# Patient Record
Sex: FEMALE | Race: WHITE | NOT HISPANIC OR LATINO
[De-identification: names, ages, dates, MRNs, and addresses within clinical notes are randomized per-mention and may not be internally consistent; named-entity substitution may affect disease eponyms.]

---

## 2019-10-18 ENCOUNTER — OTHER (OUTPATIENT)
Age: 29
End: 2019-10-18

## 2019-10-18 DIAGNOSIS — K58.0 IRRITABLE BOWEL SYNDROME WITH DIARRHEA: ICD-10-CM

## 2019-10-18 PROBLEM — Z00.00 ENCOUNTER FOR PREVENTIVE HEALTH EXAMINATION: Status: ACTIVE | Noted: 2019-10-18

## 2021-11-24 ENCOUNTER — APPOINTMENT (OUTPATIENT)
Dept: GASTROENTEROLOGY | Facility: CLINIC | Age: 31
End: 2021-11-24

## 2022-07-04 ENCOUNTER — TRANSCRIPTION ENCOUNTER (OUTPATIENT)
Age: 32
End: 2022-07-04

## 2022-07-05 ENCOUNTER — APPOINTMENT (OUTPATIENT)
Dept: PAIN MANAGEMENT | Facility: CLINIC | Age: 32
End: 2022-07-05

## 2022-07-05 VITALS
HEIGHT: 64 IN | WEIGHT: 170 LBS | DIASTOLIC BLOOD PRESSURE: 84 MMHG | TEMPERATURE: 98 F | BODY MASS INDEX: 29.02 KG/M2 | SYSTOLIC BLOOD PRESSURE: 128 MMHG

## 2022-07-05 DIAGNOSIS — Z87.39 PERSONAL HISTORY OF OTHER DISEASES OF THE MUSCULOSKELETAL SYSTEM AND CONNECTIVE TISSUE: ICD-10-CM

## 2022-07-05 PROCEDURE — 99204 OFFICE O/P NEW MOD 45 MIN: CPT

## 2022-07-05 RX ORDER — METRONIDAZOLE 7.5 MG/G
0.75 CREAM TOPICAL
Qty: 45 | Refills: 0 | Status: ACTIVE | COMMUNITY
Start: 2022-01-11

## 2022-07-05 RX ORDER — VENLAFAXINE HYDROCHLORIDE 37.5 MG/1
37.5 CAPSULE, EXTENDED RELEASE ORAL
Qty: 30 | Refills: 0 | Status: ACTIVE | COMMUNITY
Start: 2022-02-10

## 2022-07-05 RX ORDER — NORETHINDRONE ACETATE AND ETHINYL ESTRADIOL, ETHINYL ESTRADIOL AND FERROUS FUMARATE 1MG-10(24)
1 MG-10 MCG / KIT ORAL
Qty: 28 | Refills: 0 | Status: ACTIVE | COMMUNITY
Start: 2022-07-01

## 2022-07-05 NOTE — ASSESSMENT
[FreeTextEntry1] : 32 yof w/ severe low back and left leg pain.\par \par I have personally reviewed the patient's CT in detail and discussed it with them which is significant for moderate left foraminal narrowing at L4-L5.\par \par Start cyclobenzaprine prn for muscular pain.\par \par The patient has failed to have relief with over six weeks of physical therapy within the last three months and all medications. GIven their failure to improve with all other conservative measures recommend MRI lumbar spine. Patient will return to review imaging and plan for potential intervention.\par \par The patient has failed to have relief with medication management. The patient has failed to have relief with more then six weeks of physical therapy within the last three months. Given the patients failure to improve with all other conservative measures, recommend L5-S1 interlaminar epidural steroid injection under fluoroscopic guidance. The patient will follow-up with me in my office two weeks following intervention.\par \par I have discussed in detail with the patient that an interventional spine procedure is associated with potential risks. The procedure may include an injection of steroid and potentially other medications (local anesthetic and normal saline) into the epidural space or surrounding tissue of the spine. There are significant risks of this procedure which include and are not limited to infection, bleeding, worsening pain, dural puncture leading to post-dural puncture headache, nerve damage, spinal cord injury, paralysis, stroke, and death. There is a chance that the procedure does not improve their pain. There are risks associated with the steroid being absorbed into the body systemically. These include dysphoria, difficulty sleeping, mood swings, and personality changes. Pre-menopausal women may notice a regularity his in her menstrual cycle for 2-3 months following the injection. Steroids can specifically affect patients with hypertension, diabetes, and peptic ulcers. The procedure may cause a temporary increase in blood pressure and blood glucose, and may adversely affect a peptic ulcer. Other, more rare complications, including avascular necrosis of the joints, glaucoma, and osteoporosis. I have discussed the risks of the procedure at length with the patient, and the potential benefits of pain relief. I have offered alternatives to the procedure. All questions were answered. The patient expressed understanding and wishes to proceed with the procedure.\par \par Physical therapy prescribed - goal will be to increase ROM, strengthening, postural training, other modalities ad enrique which may include massage and stim. Goals of therapy discussed with the patient in detail and will be discussed with physical therapist. Patient will follow-up following course of physical therapy to monitor progress and adjust therapy as needed.\par \par Acetaminophen 1,000 mg q8h prn for moderate pain. Risks, benefits, and alternatives of acetaminophen discussed with patient.\par \par Ibuprofen 600 mg q8h prn add when pain is not adequately controlled with acetaminophen. Risks, benefits, and alternatives of ibuprofen discussed with patient.\par

## 2022-07-05 NOTE — PHYSICAL EXAM

## 2022-07-05 NOTE — DATA REVIEWED
[FreeTextEntry1] : CT LUMBAR SPINE (07/04/22):\par \par \par  The lumbar alignment is maintained without spondylolisthesis. Partial sacralization of the right L5\par transverse process is again noted. No acute fracture is identified. The vertebral body heights are \par preserved. There is mild degenerative disc disease at L4-5 and L5-S1, increased since 2018. The \par paraspinal soft tissues are within normal limits. \par \par  There are diffuse disc bulges at L3-4, L4-5, and L5-S1 with a possible superimposed broad-based \par central disc herniation at L4-5, mildly increased since previous exam. No significant central canal \par stenosis is noted. New moderate left neural foraminal narrowing is seen at L4-5 due to uncovertebral\par spurring. \par \par \par  IMPRESSION: \par \par  No acute osseous abnormality of the lumbar spine. No significant central canal stenosis. Moderate \par left neural foraminal narrowing at L4-5.

## 2022-07-05 NOTE — HISTORY OF PRESENT ILLNESS
[5] : 3. What number best describes how, during the past week, pain has interfered with your general activity? 5/10 pain [Back Pain] : back pain [___ days] : [unfilled] day(s) ago [Constant] : constant [8] : a minimum pain level of 8/10 [10] : a maximum pain level of 10/10 [Sharp] : sharp [Dull] : dull [Throbbing] : throbbing [Shooting] : shooting [Sitting] : sitting [Standing] : standing [Walking] : walking [Bending] : bending [Medications] : medications [FreeTextEntry1] : 32 yof presents w/ severe low back and left leg pain. She has had the pain for years but it became severe 48 hours ago. She was seen in the emergency room for this. Quality of life is impaired. There has been a severe exacerbation of the patient's chronic pain.  [FreeTextEntry2] : 13 [FreeTextEntry7] : Lower back pain , radiating down left hip

## 2022-07-19 ENCOUNTER — RESULT REVIEW (OUTPATIENT)
Age: 32
End: 2022-07-19

## 2022-07-25 ENCOUNTER — APPOINTMENT (OUTPATIENT)
Dept: PAIN MANAGEMENT | Facility: HOSPITAL | Age: 32
End: 2022-07-25

## 2022-10-11 ENCOUNTER — APPOINTMENT (OUTPATIENT)
Dept: PAIN MANAGEMENT | Facility: CLINIC | Age: 32
End: 2022-10-11

## 2022-10-11 VITALS
BODY MASS INDEX: 29.02 KG/M2 | HEART RATE: 133 BPM | OXYGEN SATURATION: 99 % | DIASTOLIC BLOOD PRESSURE: 114 MMHG | SYSTOLIC BLOOD PRESSURE: 160 MMHG | WEIGHT: 170 LBS | HEIGHT: 64 IN

## 2022-10-11 PROCEDURE — 99214 OFFICE O/P EST MOD 30 MIN: CPT

## 2022-10-11 NOTE — ASSESSMENT
[FreeTextEntry1] : 32 yof w/ severe low back and bilateral leg pain.\par \par I have personally reviewed the patient's CT in detail and discussed it with them which is significant for moderate left foraminal narrowing at L4-L5.\par \par I have personally reviewed the patient's MRI in detail and discussed it with them which is significant for disc bulge with annular tearing at L4-L5.\par \par The patient has failed to have relief with medication management. The patient has failed to have relief with more then six weeks of physical therapy within the last three months. Given the patients failure to improve with all other conservative measures, recommend bilateral L4-L5 transforaminal epidural steroid injection under fluoroscopic guidance. The patient will follow-up with me in my office two weeks following intervention.\par \par I have discussed in detail with the patient that an interventional spine procedure is associated with potential risks. The procedure may include an injection of steroid and potentially other medications (local anesthetic and normal saline) into the epidural space or surrounding tissue of the spine. There are significant risks of this procedure which include and are not limited to infection, bleeding, worsening pain, dural puncture leading to post-dural puncture headache, nerve damage, spinal cord injury, paralysis, stroke, and death. There is a chance that the procedure does not improve their pain. There are risks associated with the steroid being absorbed into the body systemically. These include dysphoria, difficulty sleeping, mood swings, and personality changes. Pre-menopausal women may notice a regularity his in her menstrual cycle for 2-3 months following the injection. Steroids can specifically affect patients with hypertension, diabetes, and peptic ulcers. The procedure may cause a temporary increase in blood pressure and blood glucose, and may adversely affect a peptic ulcer. Other, more rare complications, including avascular necrosis of the joints, glaucoma, and osteoporosis. I have discussed the risks of the procedure at length with the patient, and the potential benefits of pain relief. I have offered alternatives to the procedure. All questions were answered. The patient expressed understanding and wishes to proceed with the procedure.\par \par Medrol dose pack for acute pain.\par \par Physical therapy prescribed - goal will be to increase ROM, strengthening, postural training, other modalities ad enrique which may include massage and stim. Goals of therapy discussed with the patient in detail and will be discussed with physical therapist. Patient will follow-up following course of physical therapy to monitor progress and adjust therapy as needed.\par \par Acetaminophen 1,000 mg q8h prn for moderate pain. Risks, benefits, and alternatives of acetaminophen discussed with patient.\par \par Ibuprofen 600 mg q8h prn add when pain is not adequately controlled with acetaminophen. Risks, benefits, and alternatives of ibuprofen discussed with patient.\par \par Diet and nutritional strategies discussed which may improve patients pain and will improve overall health.\par \par

## 2022-10-11 NOTE — HISTORY OF PRESENT ILLNESS
[Back Pain] : back pain [___ days] : [unfilled] day(s) ago [Constant] : constant [8] : a minimum pain level of 8/10 [10] : a maximum pain level of 10/10 [Sharp] : sharp [Dull] : dull [Throbbing] : throbbing [Shooting] : shooting [Sitting] : sitting [Standing] : standing [Walking] : walking [Bending] : bending [Medications] : medications [5] : 3. What number best describes how, during the past week, pain has interfered with your general activity? 5/10 pain [FreeTextEntry1] : Interval History:\par Patient reports that she was doing well with PT however her pain just became severe again. Pain is severe. She cannot work due to the pain. No relief w/ NSAIDS.\par \par HPI: 32 yof presents w/ severe low back and left leg pain. She has had the pain for years but it became severe 48 hours ago. She was seen in the emergency room for this. Quality of life is impaired. There has been a severe exacerbation of the patient's chronic pain.  [FreeTextEntry7] : Lower back pain , radiating down left hip  [FreeTextEntry2] : 15

## 2022-10-11 NOTE — DATA REVIEWED
[FreeTextEntry1] : 07/19/22\par MRI LUMBAR SPINE\par \par \par  There are 6 nonrib-bearing lumbar-type vertebral bodies. There is lumbarization of the S1 vertebral\par body. The most inferior complete and largest intravertebral disc space has been annotated as the\par S1/S2 disc space. The disc space superior to this disc space is annotated as the L5/S1 level. If\par surgical manipulation is contemplated, would recommend comparison of this study to plain films of\par the entire spine for counting purposes.\par \par  There is normal curvature to the lumbar lordosis. The vertebral bodies are normal height and\par configuration. There is moderate loss of disc height and T2 signal at the L5/S1 disc space with\par adjacent Modic 2 surrounding changes consistent with desiccation and degenerative disease. There is\par mild loss of T2 signal within the L4/L5 disc space consistent with desiccation. The intervertebral\par disc spaces are within normal limits. The conus terminates at the L1/L2 level and demonstrates no\par evidence of abnormal signal changes. The spinal canal is capacious.\par \par  Evaluation of the individual levels demonstrates at the S1/S2 level no evidence of focal disc\par herniation or spinal canal stenosis.\par \par  At the L5/S1 level there is a mild diffuse disc bulge with a small central disc protrusion\par flattening the ventral thecal sac and and mildly compressing the the left descending S1 nerve root.\par There is mild bilateral foraminal narrowing. The bilateral L5 foraminal exiting nerve roots are\par within normal limits. There is moderate facet and ligamentous hypertrophy. There is no evidence of\par spinal canal stenosis.\par \par  At the L4/L5 level there is a mild diffuse disc bulge with a tiny posterior annular tear flattening\par the ventral thecal sac. The bilateral neuroforamen are patent. The bilateral L4 exiting nerve roots\par are within normal limits. There is mild to moderate facet and ligamentous hypertrophy. There is no\par evidence of spinal canal stenosis.\par \par  The remaining levels there is no evidence of a focal disc herniation. The bilateral neuroforamen\par are patent. The bilateral exiting foraminal nerve roots are within normal limits. There is no\par evidence of spinal canal stenosis.\par \par \par  Impression:\par \par  6 NONRIB-BEARING LUMBAR-TYPE VERTEBRAL BODIES. THERE IS LUMBARIZATION OF THE S1 VERTEBRAL BODY. THE\par MOST INFERIOR COMPLETE AND LARGEST INTRAVERTEBRAL DISC SPACE HAS BEEN ANNOTATED AS THE S1/S2 DISC\par SPACE. THE DISC SPACE SUPERIOR TO THIS DISC SPACE IS ANNOTATED AS THE L5/S1 LEVEL. IF SURGICAL\par MANIPULATION IS CONTEMPLATED, WOULD RECOMMEND COMPARISON OF THIS STUDY TO PLAIN FILMS OF THE ENTIRE\par SPINE FOR COUNTING PURPOSES.\par \par \par  Mild degenerative changes of the lumbar spine.\par \par  L5/S1 mild diffuse disc bulge with a small central disc protrusion mildly compressing the left\par descending S1 nerve root. L5/S1 no evidence of spinal canal stenosis.\par \par  L4/L5 mild diffuse disc bulge with a tiny posterior annular tear. L4/L5 no evidence of spinal canal\par stenosis.\par \par \par CT LUMBAR SPINE (07/04/22):\par \par \par  The lumbar alignment is maintained without spondylolisthesis. Partial sacralization of the right L5\par transverse process is again noted. No acute fracture is identified. The vertebral body heights are \par preserved. There is mild degenerative disc disease at L4-5 and L5-S1, increased since 2018. The \par paraspinal soft tissues are within normal limits. \par \par  There are diffuse disc bulges at L3-4, L4-5, and L5-S1 with a possible superimposed broad-based \par central disc herniation at L4-5, mildly increased since previous exam. No significant central canal \par stenosis is noted. New moderate left neural foraminal narrowing is seen at L4-5 due to uncovertebral\par spurring. \par \par \par  IMPRESSION: \par \par  No acute osseous abnormality of the lumbar spine. No significant central canal stenosis. Moderate \par left neural foraminal narrowing at L4-5.

## 2022-10-11 NOTE — PHYSICAL EXAM

## 2022-10-19 ENCOUNTER — RESULT REVIEW (OUTPATIENT)
Age: 32
End: 2022-10-19

## 2022-10-19 ENCOUNTER — APPOINTMENT (OUTPATIENT)
Dept: PAIN MANAGEMENT | Facility: HOSPITAL | Age: 32
End: 2022-10-19

## 2022-10-19 ENCOUNTER — TRANSCRIPTION ENCOUNTER (OUTPATIENT)
Age: 32
End: 2022-10-19

## 2022-11-03 ENCOUNTER — APPOINTMENT (OUTPATIENT)
Dept: PAIN MANAGEMENT | Facility: CLINIC | Age: 32
End: 2022-11-03

## 2022-11-03 PROCEDURE — 99214 OFFICE O/P EST MOD 30 MIN: CPT | Mod: 95

## 2022-11-03 NOTE — PHYSICAL EXAM

## 2022-11-03 NOTE — HISTORY OF PRESENT ILLNESS
[Back Pain] : back pain [___ days] : [unfilled] day(s) ago [Constant] : constant [8] : a minimum pain level of 8/10 [10] : a maximum pain level of 10/10 [Sharp] : sharp [Dull] : dull [Throbbing] : throbbing [Shooting] : shooting [Sitting] : sitting [Standing] : standing [Walking] : walking [Bending] : bending [Medications] : medications [5] : 3. What number best describes how, during the past week, pain has interfered with your general activity? 5/10 pain [FreeTextEntry1] : Verbal consent was given by/on: Fiona Carbajal on 11/03/22\par \par Patient location: Home\par \par Physician location: Office\par \par Reason for Telehealth visit: Back pain\par \par She returns s/p JENNIFER. Pain remains to the left of her low back. She reports modest improvement in pain. She still cannot sit due to pain. Quality of life is impaired. There has been a severe exacerbation of the patient's chronic pain. \par \par This service took place using a two way audio and visual platform. The patient and Dr. Echavarria were both able to see each other and communicate through video. There were no barriers to communication. Greater then 50% of the time spent in the encounter involved counseling and coordination of care. \par \par Time spent on visit: 30 minutes\par \par \par HPI: 32 yof presents w/ severe low back and left leg pain. She has had the pain for years but it became severe 48 hours ago. She was seen in the emergency room for this. Quality of life is impaired. There has been a severe exacerbation of the patient's chronic pain.  [FreeTextEntry7] : Lower back pain , radiating down left hip  [FreeTextEntry2] : 15

## 2022-11-03 NOTE — ASSESSMENT
[FreeTextEntry1] : 32 yof w/ severe low back and bilateral leg pain.\par \par I have personally reviewed the patient's CT in detail and discussed it with them which is significant for moderate left foraminal narrowing at L4-L5.\par \par I have personally reviewed the patient's MRI in detail and discussed it with them which is significant for disc bulge with annular tearing at L4-L5.\par \par Start gabapentin 300 mg TID.\par \par Physical therapy prescribed - goal will be to increase ROM, strengthening, postural training, other modalities ad enrique which may include massage and stim. Goals of therapy discussed with the patient in detail and will be discussed with physical therapist. Patient will follow-up following course of physical therapy to monitor progress and adjust therapy as needed.\par \par Acetaminophen 1,000 mg q8h prn for moderate pain. Risks, benefits, and alternatives of acetaminophen discussed with patient.\par \par Ibuprofen 600 mg q8h prn add when pain is not adequately controlled with acetaminophen. Risks, benefits, and alternatives of ibuprofen discussed with patient.\par \par Diet and nutritional strategies discussed which may improve patients pain and will improve overall health.\par \par If pain persists plan for L5-S1 interlaminar epidural steroid injection.\par \par I explained to patient benefits and limitation of TeleMedicine visits. Patient understands that limitations include inability to perform comprehensive physical exam, which may lead to potential diagnostic inconsistencies.  Patient understands that diagnosis and treatment may be limited by these inconsistencies and patient agrees to proceed with care plan\par

## 2022-11-03 NOTE — DATA REVIEWED
[FreeTextEntry1] : 07/19/22\par MRI LUMBAR SPINE\par \par  There are 6 nonrib-bearing lumbar-type vertebral bodies. There is lumbarization of the S1 vertebral\par body. The most inferior complete and largest intravertebral disc space has been annotated as the\par S1/S2 disc space. The disc space superior to this disc space is annotated as the L5/S1 level. If\par surgical manipulation is contemplated, would recommend comparison of this study to plain films of\par the entire spine for counting purposes.\par \par  There is normal curvature to the lumbar lordosis. The vertebral bodies are normal height and\par configuration. There is moderate loss of disc height and T2 signal at the L5/S1 disc space with\par adjacent Modic 2 surrounding changes consistent with desiccation and degenerative disease. There is\par mild loss of T2 signal within the L4/L5 disc space consistent with desiccation. The intervertebral\par disc spaces are within normal limits. The conus terminates at the L1/L2 level and demonstrates no\par evidence of abnormal signal changes. The spinal canal is capacious.\par \par  Evaluation of the individual levels demonstrates at the S1/S2 level no evidence of focal disc\par herniation or spinal canal stenosis.\par \par  At the L5/S1 level there is a mild diffuse disc bulge with a small central disc protrusion\par flattening the ventral thecal sac and and mildly compressing the the left descending S1 nerve root.\par There is mild bilateral foraminal narrowing. The bilateral L5 foraminal exiting nerve roots are\par within normal limits. There is moderate facet and ligamentous hypertrophy. There is no evidence of\par spinal canal stenosis.\par \par  At the L4/L5 level there is a mild diffuse disc bulge with a tiny posterior annular tear flattening\par the ventral thecal sac. The bilateral neuroforamen are patent. The bilateral L4 exiting nerve roots\par are within normal limits. There is mild to moderate facet and ligamentous hypertrophy. There is no\par evidence of spinal canal stenosis.\par \par  The remaining levels there is no evidence of a focal disc herniation. The bilateral neuroforamen\par are patent. The bilateral exiting foraminal nerve roots are within normal limits. There is no\par evidence of spinal canal stenosis.\par \par \par  Impression:\par \par  6 NONRIB-BEARING LUMBAR-TYPE VERTEBRAL BODIES. THERE IS LUMBARIZATION OF THE S1 VERTEBRAL BODY. THE\par MOST INFERIOR COMPLETE AND LARGEST INTRAVERTEBRAL DISC SPACE HAS BEEN ANNOTATED AS THE S1/S2 DISC\par SPACE. THE DISC SPACE SUPERIOR TO THIS DISC SPACE IS ANNOTATED AS THE L5/S1 LEVEL. IF SURGICAL\par MANIPULATION IS CONTEMPLATED, WOULD RECOMMEND COMPARISON OF THIS STUDY TO PLAIN FILMS OF THE ENTIRE\par SPINE FOR COUNTING PURPOSES.\par \par \par  Mild degenerative changes of the lumbar spine.\par \par  L5/S1 mild diffuse disc bulge with a small central disc protrusion mildly compressing the left\par descending S1 nerve root. L5/S1 no evidence of spinal canal stenosis.\par \par  L4/L5 mild diffuse disc bulge with a tiny posterior annular tear. L4/L5 no evidence of spinal canal\par stenosis.\par \par \par CT LUMBAR SPINE (07/04/22):\par \par \par  The lumbar alignment is maintained without spondylolisthesis. Partial sacralization of the right L5\par transverse process is again noted. No acute fracture is identified. The vertebral body heights are \par preserved. There is mild degenerative disc disease at L4-5 and L5-S1, increased since 2018. The \par paraspinal soft tissues are within normal limits. \par \par  There are diffuse disc bulges at L3-4, L4-5, and L5-S1 with a possible superimposed broad-based \par central disc herniation at L4-5, mildly increased since previous exam. No significant central canal \par stenosis is noted. New moderate left neural foraminal narrowing is seen at L4-5 due to uncovertebral\par spurring. \par \par \par  IMPRESSION: \par \par  No acute osseous abnormality of the lumbar spine. No significant central canal stenosis. Moderate \par left neural foraminal narrowing at L4-5.

## 2022-11-29 ENCOUNTER — RX RENEWAL (OUTPATIENT)
Age: 32
End: 2022-11-29

## 2022-12-14 ENCOUNTER — APPOINTMENT (OUTPATIENT)
Dept: GASTROENTEROLOGY | Facility: CLINIC | Age: 32
End: 2022-12-14

## 2022-12-22 ENCOUNTER — APPOINTMENT (OUTPATIENT)
Dept: PAIN MANAGEMENT | Facility: CLINIC | Age: 32
End: 2022-12-22

## 2022-12-22 PROCEDURE — 99214 OFFICE O/P EST MOD 30 MIN: CPT | Mod: 95

## 2022-12-22 NOTE — HISTORY OF PRESENT ILLNESS
[Back Pain] : back pain [___ days] : [unfilled] day(s) ago [Constant] : constant [8] : a minimum pain level of 8/10 [10] : a maximum pain level of 10/10 [Sharp] : sharp [Dull] : dull [Throbbing] : throbbing [Shooting] : shooting [Sitting] : sitting [Standing] : standing [Walking] : walking [Bending] : bending [Medications] : medications [5] : 3. What number best describes how, during the past week, pain has interfered with your general activity? 5/10 pain [FreeTextEntry1] : Verbal consent was given by/on: Fiona Carbajal on 12/22/22\par \par Patient location: Home\par \par Physician location: Office\par \par Reason for Telehealth visit: Back pain\par \par She reports that she was feeling about 90% better, even able to be in a car for five hours without pain. Prior she could not sit for five minutes without pain.Gabapentin was also helping her tremendously. Recently she just worsened her pain when putting on her shoes. Pain has been severe. Steroids are helping. Pain remains severe. Quality of life is impaired. There has been a severe exacerbation of the patient's chronic pain.\par \par This service took place using a two way audio and visual platform. The patient and Dr. Echavarria were both able to see each other and communicate through video. There were no barriers to communication. Greater then 50% of the time spent in the encounter involved counseling and coordination of care. \par \par Time spent on visit: 30 minutes\par \par \par HPI: 32 yof presents w/ severe low back and left leg pain. She has had the pain for years but it became severe 48 hours ago. She was seen in the emergency room for this. Quality of life is impaired. There has been a severe exacerbation of the patient's chronic pain.  [FreeTextEntry7] : Lower back pain , radiating down left hip  [FreeTextEntry2] : 15

## 2022-12-22 NOTE — ASSESSMENT
[FreeTextEntry1] : 32 yof w/ severe low back and bilateral leg pain.\par \par I have personally reviewed the patient's CT in detail and discussed it with them which is significant for moderate left foraminal narrowing at L4-L5.\par \par I have personally reviewed the patient's MRI in detail and discussed it with them which is significant for disc bulge with annular tearing at L4-L5.\par \par Increase titration of gabapentin as tolerated.\par \par The patient has failed to have relief with medication management. The patient has failed to have relief with more then six weeks of physical therapy within the last three months. Given the patients failure to improve with all other conservative measures, recommend L4-L5 interlaminar epidural steroid injection under fluoroscopic guidance. The patient will follow-up with me in my office two weeks following intervention.\par \par I have discussed in detail with the patient that an interventional spine procedure is associated with potential risks. The procedure may include an injection of steroid and potentially other medications (local anesthetic and normal saline) into the epidural space or surrounding tissue of the spine. There are significant risks of this procedure which include and are not limited to infection, bleeding, worsening pain, dural puncture leading to post-dural puncture headache, nerve damage, spinal cord injury, paralysis, stroke, and death. There is a chance that the procedure does not improve their pain. There are risks associated with the steroid being absorbed into the body systemically. These include dysphoria, difficulty sleeping, mood swings, and personality changes. Pre-menopausal women may notice a regularity his in her menstrual cycle for 2-3 months following the injection. Steroids can specifically affect patients with hypertension, diabetes, and peptic ulcers. The procedure may cause a temporary increase in blood pressure and blood glucose, and may adversely affect a peptic ulcer. Other, more rare complications, including avascular necrosis of the joints, glaucoma, and osteoporosis. I have discussed the risks of the procedure at length with the patient, and the potential benefits of pain relief. I have offered alternatives to the procedure. All questions were answered. The patient expressed understanding and wishes to proceed with the procedure.\par \par \par Physical therapy prescribed - goal will be to increase ROM, strengthening, postural training, other modalities ad enrique which may include massage and stim. Goals of therapy discussed with the patient in detail and will be discussed with physical therapist. Patient will follow-up following course of physical therapy to monitor progress and adjust therapy as needed.\par \par Acetaminophen 1,000 mg q8h prn for moderate pain. Risks, benefits, and alternatives of acetaminophen discussed with patient.\par \par Ibuprofen 600 mg q8h prn add when pain is not adequately controlled with acetaminophen. Risks, benefits, and alternatives of ibuprofen discussed with patient.\par \par Diet and nutritional strategies discussed which may improve patients pain and will improve overall health.\par \par I explained to patient benefits and limitation of TeleMedicine visits. Patient understands that limitations include inability to perform comprehensive physical exam, which may lead to potential diagnostic inconsistencies.  Patient understands that diagnosis and treatment may be limited by these inconsistencies and patient agrees to proceed with care plan\par

## 2022-12-22 NOTE — PHYSICAL EXAM

## 2022-12-28 ENCOUNTER — APPOINTMENT (OUTPATIENT)
Dept: PAIN MANAGEMENT | Facility: CLINIC | Age: 32
End: 2022-12-28

## 2022-12-28 VITALS
OXYGEN SATURATION: 99 % | HEART RATE: 108 BPM | WEIGHT: 175 LBS | BODY MASS INDEX: 29.88 KG/M2 | DIASTOLIC BLOOD PRESSURE: 83 MMHG | HEIGHT: 64 IN | RESPIRATION RATE: 16 BRPM | SYSTOLIC BLOOD PRESSURE: 165 MMHG

## 2022-12-28 PROCEDURE — 62323 NJX INTERLAMINAR LMBR/SAC: CPT

## 2022-12-28 NOTE — PROCEDURE
[FreeTextEntry1] : PROCEDURE: L4-L5 Interlaminar epidural steroid injection under fluoroscopic guidance.\par \par CHIEF COMPLAINT: Low back and leg pain.  \par \par PREOPERATIVE DIAGNOSIS:	Lumbar radiculopathy.\par \par POSTOPERATIVE DIAGNOSIS:  Lumbar radiculopathy. 	\par \par ANESTHESIA:  Local.\par \par COMPLICATIONS:  	None.\par \par ESTIMATED BLOOD LOSS:  None.	\par \par INDICATIONS: Mrs. Carbajal is a very pleasant 32 yof who has significant low back and leg pain.  The patient has failed to have relief with medication management and physical therapy. Given their failure to improve with all other conservative measures, it was determined that the patient would benefit from a L4-L5 interlaminar epidural steroid injection under fluoroscopic guidance.  \par \par The patient denies any fevers, chills, nausea, vomiting, diarrhea, constipation, chest pain, shortness of breath, or burning on urination.  They deny any latex allergy.  They are not taking any anticoagulants and do not have a history of coagulopathy.  The patient denies any IV contrast allergy.     \par \par PROCEDURE COURSE: The risks, benefits, and alternatives of the lumbar interlaminar epidural steroid injection were explained to the patient.  All questions were answered to their own satisfaction.  Written consent was signed and placed in the chart. The patient’s low back was marked in the preoperative holding area. \par \par The patient was brought to the Operating Room and placed in the prone position with a pillow under the abdomen to reduce lumbar lordosis.  A time-out was taken identifying the patient, the procedure, the site and side, and the patient’s allergies.  ASA standard monitors were applied for monitoring throughout the procedure.  The patient’s back was prepped and draped with Chloroprep in the usual sterile fashion and sterile technique was adhered to throughout the entire procedure.\par \par The lumbar spine was visualized under fluoroscopy in the AP view. The 12th rib and T12 vertebra were identified as a reference point and the lumbar vertebral bodies were counted.  The L4 vertebral body was then squared. The vertebral body and the superior and inferior end plates were aligned and the spinous processes were adjusted until midline. The L4-L5 interlaminar space was identified under fluoroscopic guidance and a caudal tilt was utilized to optimize the opening of the interlaminar space. The skin and subcutaneous tissues were infiltrated with 1% Lidocaine.  After adequate local anesthesia was obtained, a 20g Tuohy needle was inserted at L4-L5 interspace. The needle was carefully advanced, alternating between AP and lateral views, to ensure appropriate trajectory and depth. Once the ligamentum flavum was engaged, a sterile glass syringe was employed and a loss of resistance to air technique was utilized to identify the epidural space. Once obtained, negative aspiration for hem and CSF was obtained, further confirming location. Following this, 1 cc of contrast was administered and epidural spread was confirmed in the AP and lateral views. Following this, 80 mg of methylprednisolone and 2 cc of 1% lidocaine was slowly administered in incremental amounts.\par \par All injections were done with negative aspiration for cerebrospinal fluid or heme, and all needles were withdrawn without incident. I personally met with the patient following the procedure and all questions were answered. The patient tolerated the procedure well without any apparent complications and was transferred to the Recovery Room in stable condition. The patient was provided with discharge instructions and will follow-up with me in my office.\par \par 	___________________________________\par 	Troy Echavarria M.D.\par

## 2022-12-29 ENCOUNTER — APPOINTMENT (OUTPATIENT)
Dept: PAIN MANAGEMENT | Facility: CLINIC | Age: 32
End: 2022-12-29

## 2023-01-11 ENCOUNTER — APPOINTMENT (OUTPATIENT)
Dept: PAIN MANAGEMENT | Facility: CLINIC | Age: 33
End: 2023-01-11
Payer: COMMERCIAL

## 2023-01-11 PROCEDURE — 99214 OFFICE O/P EST MOD 30 MIN: CPT | Mod: 95

## 2023-01-11 NOTE — ASSESSMENT
[FreeTextEntry1] : 32 yof w/ severe low back and bilateral leg pain now with complete relief after JENNIFER. \par \par I have personally reviewed the patient's CT in detail and discussed it with them which is significant for moderate left foraminal narrowing at L4-L5.\par \par I have personally reviewed the patient's MRI in detail and discussed it with them which is significant for disc bulge with annular tearing at L4-L5.\par \par She may discontinue gabapentin as it is no longer needed.\par \par Physical therapy prescribed - goal will be to increase ROM, strengthening, postural training, other modalities ad enrique which may include massage and stim. Goals of therapy discussed with the patient in detail and will be discussed with physical therapist. Patient will follow-up following course of physical therapy to monitor progress and adjust therapy as needed.\par \par Acetaminophen 1,000 mg q8h prn for moderate pain. Risks, benefits, and alternatives of acetaminophen discussed with patient.\par \par Ibuprofen 600 mg q8h prn add when pain is not adequately controlled with acetaminophen. Risks, benefits, and alternatives of ibuprofen discussed with patient.\par \par Diet and nutritional strategies discussed which may improve patients pain and will improve overall health.\par  \par RTC three months.\par \par I explained to patient benefits and limitation of TeleMedicine visits. Patient understands that limitations include inability to perform comprehensive physical exam, which may lead to potential diagnostic inconsistencies.  Patient understands that diagnosis and treatment may be limited by these inconsistencies and patient agrees to proceed with care plan\par

## 2023-01-11 NOTE — HISTORY OF PRESENT ILLNESS
[Back Pain] : back pain [___ days] : [unfilled] day(s) ago [Constant] : constant [8] : a minimum pain level of 8/10 [10] : a maximum pain level of 10/10 [Sharp] : sharp [Dull] : dull [Throbbing] : throbbing [Shooting] : shooting [Sitting] : sitting [Standing] : standing [Walking] : walking [Bending] : bending [Medications] : medications [5] : 3. What number best describes how, during the past week, pain has interfered with your general activity? 5/10 pain [FreeTextEntry1] : Verbal consent was given by/on: Fiona Carbajal on 01/11/23\par \par Patient location: Home\par \par Physician location: Office\par \par Reason for Telehealth visit: Back pain\par \par She reports 100% pain relief from her JENNIFER. No side effects. She is excited to talk about a exercise program that she can follow. She does not have the time for physical therapy right now. \par \par This service took place using a two way audio and visual platform. The patient and Dr. Echavarria were both able to see each other and communicate through video. There were no barriers to communication. Greater then 50% of the time spent in the encounter involved counseling and coordination of care. \par \par Time spent on visit: 30 minutes\par \par \par HPI: 32 yof presents w/ severe low back and left leg pain. She has had the pain for years but it became severe 48 hours ago. She was seen in the emergency room for this. Quality of life is impaired. There has been a severe exacerbation of the patient's chronic pain.  [FreeTextEntry7] : Lower back pain , radiating down left hip  [FreeTextEntry2] : 15

## 2023-01-11 NOTE — PHYSICAL EXAM
[de-identified] : Constitutional: Well-developed, in no acute distress\par \par Psychiatric: Appropriate mood and affect, oriented to time, place, person, and situation\par \par \par

## 2023-05-24 ENCOUNTER — APPOINTMENT (OUTPATIENT)
Dept: PAIN MANAGEMENT | Facility: CLINIC | Age: 33
End: 2023-05-24
Payer: COMMERCIAL

## 2023-05-24 PROCEDURE — 99214 OFFICE O/P EST MOD 30 MIN: CPT | Mod: 95

## 2023-05-24 NOTE — HISTORY OF PRESENT ILLNESS
[Back Pain] : back pain [___ days] : [unfilled] day(s) ago [Constant] : constant [8] : a minimum pain level of 8/10 [10] : a maximum pain level of 10/10 [Sharp] : sharp [Dull] : dull [Throbbing] : throbbing [Shooting] : shooting [Sitting] : sitting [Standing] : standing [Walking] : walking [Bending] : bending [Medications] : medications [5] : 3. What number best describes how, during the past week, pain has interfered with your general activity? 5/10 pain [FreeTextEntry1] : Verbal consent was given by/on: Fiona Carbajal on 05/24/23\par \par Patient location: Home\par \par Physician location: Office\par \par Reason for Telehealth visit: Back pain\par \par She reports 100% pain relief from her JENNIFER back in December. She reports now that her pain recently returned. She started a medrol dose pack which is helpful. She has also started back on gabapentin. All of her complaints began about a year ago. Quality of life is impaired. There has been a severe exacerbation of the patient's chronic pain.\par \par This service took place using a two way audio and visual platform. The patient and Dr. Echavarria were both able to see each other and communicate through video. There were no barriers to communication. Greater then 50% of the time spent in the encounter involved counseling and coordination of care. \par \par Time spent on visit: 30 minutes\par \par HPI: 32 yof presents w/ severe low back and left leg pain. She has had the pain for years but it became severe 48 hours ago. She was seen in the emergency room for this. Quality of life is impaired. There has been a severe exacerbation of the patient's chronic pain.  [FreeTextEntry7] : Lower back pain , radiating down left hip  [FreeTextEntry2] : 15

## 2023-05-24 NOTE — PHYSICAL EXAM
[de-identified] : Constitutional: Well-developed, in no acute distress\par \par Psychiatric: Appropriate mood and affect, oriented to time, place, person, and situation\par \par \par

## 2023-05-24 NOTE — ASSESSMENT
[FreeTextEntry1] : 32 yof w/ severe low back and bilateral leg pain now with complete relief after JENNIFER but pain has recently returned.\par \par I have personally reviewed the patient's CT in detail and discussed it with them which is significant for moderate left foraminal narrowing at L4-L5.\par \par I have personally reviewed the patient's MRI in detail and discussed it with them which is significant for disc bulge with annular tearing at L4-L5.\par \par She is taking gabapentin at this time, continue this.\par \par If pain persists plan for JENNIFER. \par \par Physical therapy prescribed - goal will be to increase ROM, strengthening, postural training, other modalities ad enrique which may include massage and stim. Goals of therapy discussed with the patient in detail and will be discussed with physical therapist. Patient will follow-up following course of physical therapy to monitor progress and adjust therapy as needed.\par \par Acetaminophen 1,000 mg q8h prn for moderate pain. Risks, benefits, and alternatives of acetaminophen discussed with patient.\par \par Ibuprofen 600 mg q8h prn add when pain is not adequately controlled with acetaminophen. Risks, benefits, and alternatives of ibuprofen discussed with patient.\par \par Diet and nutritional strategies discussed which may improve patients pain and will improve overall health.\par  \par RTC three months.\par \par I explained to patient benefits and limitation of TeleMedicine visits. Patient understands that limitations include inability to perform comprehensive physical exam, which may lead to potential diagnostic inconsistencies.  Patient understands that diagnosis and treatment may be limited by these inconsistencies and patient agrees to proceed with care plan\par

## 2023-07-18 ENCOUNTER — APPOINTMENT (OUTPATIENT)
Dept: GASTROENTEROLOGY | Facility: CLINIC | Age: 33
End: 2023-07-18
Payer: COMMERCIAL

## 2023-07-18 VITALS
WEIGHT: 185 LBS | SYSTOLIC BLOOD PRESSURE: 166 MMHG | OXYGEN SATURATION: 100 % | BODY MASS INDEX: 31.58 KG/M2 | HEART RATE: 95 BPM | HEIGHT: 64 IN | DIASTOLIC BLOOD PRESSURE: 109 MMHG

## 2023-07-18 PROCEDURE — 99204 OFFICE O/P NEW MOD 45 MIN: CPT

## 2023-07-18 RX ORDER — METHYLPREDNISOLONE 4 MG/1
4 TABLET ORAL
Qty: 1 | Refills: 0 | Status: DISCONTINUED | COMMUNITY
Start: 2022-12-20 | End: 2023-07-18

## 2023-07-18 RX ORDER — RIFAXIMIN 550 MG/1
550 TABLET ORAL
Qty: 28 | Refills: 2 | Status: DISCONTINUED | COMMUNITY
Start: 2019-10-18 | End: 2023-07-18

## 2023-07-18 RX ORDER — CYCLOBENZAPRINE HYDROCHLORIDE 10 MG/1
10 TABLET, FILM COATED ORAL
Qty: 30 | Refills: 0 | Status: DISCONTINUED | COMMUNITY
Start: 2022-07-05 | End: 2023-07-18

## 2023-07-18 RX ORDER — DOXYCYCLINE 100 MG/1
100 CAPSULE ORAL
Qty: 60 | Refills: 0 | Status: DISCONTINUED | COMMUNITY
Start: 2022-05-11 | End: 2023-07-18

## 2023-07-18 RX ORDER — BUPROPION HYDROCHLORIDE 150 MG/1
150 TABLET, EXTENDED RELEASE ORAL
Qty: 30 | Refills: 0 | Status: DISCONTINUED | COMMUNITY
Start: 2021-12-16 | End: 2023-07-18

## 2023-07-18 RX ORDER — ESCITALOPRAM OXALATE 20 MG/1
20 TABLET ORAL
Qty: 30 | Refills: 0 | Status: DISCONTINUED | COMMUNITY
Start: 2021-12-16 | End: 2023-07-18

## 2023-07-18 RX ORDER — DEXAMETHASONE 4 MG/1
4 TABLET ORAL
Qty: 10 | Refills: 0 | Status: DISCONTINUED | COMMUNITY
Start: 2022-07-04 | End: 2023-07-18

## 2023-07-18 RX ORDER — METHYLPREDNISOLONE 4 MG/1
4 TABLET ORAL
Qty: 1 | Refills: 0 | Status: DISCONTINUED | COMMUNITY
Start: 2022-10-11 | End: 2023-07-18

## 2023-07-18 RX ORDER — ESCITALOPRAM OXALATE 10 MG/1
10 TABLET ORAL
Qty: 30 | Refills: 0 | Status: DISCONTINUED | COMMUNITY
Start: 2022-02-10 | End: 2023-07-18

## 2023-07-18 NOTE — PHYSICAL EXAM

## 2023-07-19 LAB
ALBUMIN SERPL ELPH-MCNC: 5.2 G/DL
ALP BLD-CCNC: 72 U/L
ALT SERPL-CCNC: 23 U/L
ANION GAP SERPL CALC-SCNC: 19 MMOL/L
AST SERPL-CCNC: 35 U/L
BILIRUB SERPL-MCNC: 0.3 MG/DL
BUN SERPL-MCNC: 8 MG/DL
CALCIUM SERPL-MCNC: 10.2 MG/DL
CHLORIDE SERPL-SCNC: 97 MMOL/L
CO2 SERPL-SCNC: 20 MMOL/L
CREAT SERPL-MCNC: 0.6 MG/DL
CRP SERPL-MCNC: 8 MG/L
EGFR: 120 ML/MIN/1.73M2
ESTIMATED AVERAGE GLUCOSE: 100 MG/DL
FERRITIN SERPL-MCNC: 447 NG/ML
FOLATE SERPL-MCNC: 6.7 NG/ML
GLUCOSE SERPL-MCNC: 91 MG/DL
HBA1C MFR BLD HPLC: 5.1 %
HCG SERPL-MCNC: <1 MIU/ML
IGA SER QL IEP: 170 MG/DL
IRON SATN MFR SERPL: 25 %
IRON SERPL-MCNC: 117 UG/DL
POTASSIUM SERPL-SCNC: 4.5 MMOL/L
PROT SERPL-MCNC: 7.9 G/DL
SODIUM SERPL-SCNC: 136 MMOL/L
TIBC SERPL-MCNC: 475 UG/DL
TSH SERPL-ACNC: 3.83 UIU/ML
UIBC SERPL-MCNC: 358 UG/DL
VIT B12 SERPL-MCNC: 202 PG/ML

## 2023-07-20 LAB
CDIFF BY PCR: NOT DETECTED
ENDOMYSIUM IGA SER QL: NEGATIVE
ENDOMYSIUM IGA TITR SER: NORMAL
ENTEROPATHOGENIC E. COLI (EPEC): DETECTED
GI PCR PANEL: DETECTED
TTG IGA SER IA-ACNC: <1.2 U/ML
TTG IGA SER-ACNC: NEGATIVE
TTG IGG SER IA-ACNC: <1.2 U/ML
TTG IGG SER IA-ACNC: NEGATIVE

## 2023-07-20 NOTE — ASSESSMENT
[FreeTextEntry1] : diarrhea, acute on chronic\par ?infectious vs. IBD ?Crohns disease\par labs, stool studies\par ileocolonoscopy\par MRE\par \par Fam hx crc- father with early colon cancer, patient reports previous negative genetic testing\par colonoscopy due for screening. \par Risks (including but not limited to sedation risks, infection, bleeding, perforation, possibility of missed lesions), benefits and alternatives to procedure, including not doing the procedure, were discussed with patient. Patient understood and agreed to proceed with colonoscopy. \par Colonoscopy preparation instructions reviewed with patient.\par

## 2023-07-20 NOTE — HISTORY OF PRESENT ILLNESS
[FreeTextEntry1] : 33 year F fam hx crc (father CRC age 55)  h/o IBS- D presents with c/o acute on chronic diarrhea. Shw was seen in 07/2018 for diarrhea. She is seen at the request of Dr. Shea Barton\par \par labs- cbc/cmet, esr, crp, celiac negative\par fecal calpro/elastase, infectious stool studies neg\par CT A/P 07/2018- unrevealing. \par \par EGD/ileoColonoscopy with bx- unrevealing\par \par from 2018 -to Jan 2023 she had period dc flare of diarrhea that were stable. \par today, reports acute on chronic diarrhea x 6 months. no brbpr/melena. a/w severe cramping, comes/goes, not always better after bm. \par she started elimination diet recently, cannot identify foods triggers. \par no f/c, no n/v/no  hb/reflux\par no brbpr/melena. \par avg 6 bm per day\par she has gained ~ 50 lbs in the last few years, 15 lbs in the past year. \par was a techer, now a -very sedentary. had herniated disc last year. \par denies FI, no nocturnal tools \par denies travel , sick contacts, abx\par \par had labs with PMD Dr. Shea Barton that were reportedly unremarkable. \par reports genetic testing with PMD that was reportedly unremarkable\par \par takes gabapentin PRN for pain, gets steroid injections in her back periodically. \par

## 2023-07-20 NOTE — ADDENDUM
[FreeTextEntry1] : 7/20/23- spoke with patient- reviewed labs- GI PCR panel + Ecoli- will treat with azithromycin, patient to notify me of response. \par B12 low, elevated MCV- recommend she schedule injections with PCP. \par Ferritin and CRP elevated ?IBD. MRE and colonoscopy pending. \par

## 2023-07-21 LAB
GLIADIN IGA SER QL: <5 UNITS
GLIADIN IGG SER QL: <5 UNITS
GLIADIN PEPTIDE IGA SER-ACNC: NEGATIVE
GLIADIN PEPTIDE IGG SER-ACNC: NEGATIVE

## 2023-07-24 LAB
CALPROTECTIN FECAL: 5 UG/G
PANCREATIC ELASTASE, FECAL: >500 CD:794062645

## 2023-07-26 ENCOUNTER — RESULT REVIEW (OUTPATIENT)
Age: 33
End: 2023-07-26

## 2023-07-27 ENCOUNTER — RESULT REVIEW (OUTPATIENT)
Age: 33
End: 2023-07-27

## 2023-07-27 ENCOUNTER — APPOINTMENT (OUTPATIENT)
Dept: GASTROENTEROLOGY | Facility: HOSPITAL | Age: 33
End: 2023-07-27

## 2023-08-21 RX ORDER — GABAPENTIN 300 MG/1
300 CAPSULE ORAL
Qty: 150 | Refills: 0 | Status: ACTIVE | COMMUNITY
Start: 2022-11-03 | End: 1900-01-01

## 2023-08-22 ENCOUNTER — RESULT REVIEW (OUTPATIENT)
Age: 33
End: 2023-08-22

## 2023-10-18 ENCOUNTER — APPOINTMENT (OUTPATIENT)
Dept: GASTROENTEROLOGY | Facility: CLINIC | Age: 33
End: 2023-10-18
Payer: COMMERCIAL

## 2023-10-18 VITALS
BODY MASS INDEX: 31.58 KG/M2 | OXYGEN SATURATION: 97 % | DIASTOLIC BLOOD PRESSURE: 70 MMHG | WEIGHT: 185 LBS | SYSTOLIC BLOOD PRESSURE: 108 MMHG | HEART RATE: 110 BPM | HEIGHT: 64 IN

## 2023-10-18 DIAGNOSIS — R19.7 DIARRHEA, UNSPECIFIED: ICD-10-CM

## 2023-10-18 PROCEDURE — 99214 OFFICE O/P EST MOD 30 MIN: CPT

## 2023-10-18 RX ORDER — AZITHROMYCIN 500 MG/1
500 TABLET, FILM COATED ORAL DAILY
Qty: 3 | Refills: 0 | Status: DISCONTINUED | COMMUNITY
Start: 2023-07-20 | End: 2023-10-18

## 2023-10-18 RX ORDER — LEVOFLOXACIN 500 MG/1
500 TABLET, FILM COATED ORAL
Qty: 3 | Refills: 0 | Status: DISCONTINUED | COMMUNITY
Start: 2023-07-24 | End: 2023-10-18

## 2023-10-18 RX ORDER — OXYCODONE AND ACETAMINOPHEN 7.5; 325 MG/1; MG/1
7.5-325 TABLET ORAL
Qty: 20 | Refills: 0 | Status: DISCONTINUED | COMMUNITY
Start: 2022-07-04 | End: 2023-10-18

## 2023-10-18 RX ORDER — LISINOPRIL 5 MG/1
5 TABLET ORAL
Refills: 0 | Status: ACTIVE | COMMUNITY
Start: 2023-10-18

## 2023-11-22 RX ORDER — METHYLPREDNISOLONE 4 MG/1
4 TABLET ORAL
Qty: 1 | Refills: 0 | Status: ACTIVE | COMMUNITY
Start: 2023-11-22 | End: 1900-01-01

## 2023-11-29 ENCOUNTER — APPOINTMENT (OUTPATIENT)
Dept: PAIN MANAGEMENT | Facility: CLINIC | Age: 33
End: 2023-11-29

## 2023-11-30 ENCOUNTER — APPOINTMENT (OUTPATIENT)
Dept: GASTROENTEROLOGY | Facility: HOSPITAL | Age: 33
End: 2023-11-30

## 2023-12-13 ENCOUNTER — APPOINTMENT (OUTPATIENT)
Dept: PAIN MANAGEMENT | Facility: CLINIC | Age: 33
End: 2023-12-13
Payer: COMMERCIAL

## 2023-12-13 DIAGNOSIS — M79.10 MYALGIA, UNSPECIFIED SITE: ICD-10-CM

## 2023-12-13 DIAGNOSIS — M54.16 RADICULOPATHY, LUMBAR REGION: ICD-10-CM

## 2023-12-13 DIAGNOSIS — G89.4 CHRONIC PAIN SYNDROME: ICD-10-CM

## 2023-12-13 PROCEDURE — 99214 OFFICE O/P EST MOD 30 MIN: CPT | Mod: 95

## 2023-12-13 RX ORDER — METHYLPREDNISOLONE 4 MG/1
4 TABLET ORAL
Qty: 1 | Refills: 0 | Status: ACTIVE | COMMUNITY
Start: 2023-12-13 | End: 1900-01-01

## 2023-12-13 NOTE — DATA REVIEWED
[FreeTextEntry1] : 07/19/22 MRI LUMBAR SPINE  There are 6 nonrib-bearing lumbar-type vertebral bodies. There is lumbarization of the S1 vertebral body. The most inferior complete and largest intravertebral disc space has been annotated as the S1/S2 disc space. The disc space superior to this disc space is annotated as the L5/S1 level. If surgical manipulation is contemplated, would recommend comparison of this study to plain films of the entire spine for counting purposes.   There is normal curvature to the lumbar lordosis. The vertebral bodies are normal height and configuration. There is moderate loss of disc height and T2 signal at the L5/S1 disc space with adjacent Modic 2 surrounding changes consistent with desiccation and degenerative disease. There is mild loss of T2 signal within the L4/L5 disc space consistent with desiccation. The intervertebral disc spaces are within normal limits. The conus terminates at the L1/L2 level and demonstrates no evidence of abnormal signal changes. The spinal canal is capacious.   Evaluation of the individual levels demonstrates at the S1/S2 level no evidence of focal disc herniation or spinal canal stenosis.   At the L5/S1 level there is a mild diffuse disc bulge with a small central disc protrusion flattening the ventral thecal sac and and mildly compressing the the left descending S1 nerve root. There is mild bilateral foraminal narrowing. The bilateral L5 foraminal exiting nerve roots are within normal limits. There is moderate facet and ligamentous hypertrophy. There is no evidence of spinal canal stenosis.   At the L4/L5 level there is a mild diffuse disc bulge with a tiny posterior annular tear flattening the ventral thecal sac. The bilateral neuroforamen are patent. The bilateral L4 exiting nerve roots are within normal limits. There is mild to moderate facet and ligamentous hypertrophy. There is no evidence of spinal canal stenosis.   The remaining levels there is no evidence of a focal disc herniation. The bilateral neuroforamen are patent. The bilateral exiting foraminal nerve roots are within normal limits. There is no evidence of spinal canal stenosis.    Impression:   6 NONRIB-BEARING LUMBAR-TYPE VERTEBRAL BODIES. THERE IS LUMBARIZATION OF THE S1 VERTEBRAL BODY. THE MOST INFERIOR COMPLETE AND LARGEST INTRAVERTEBRAL DISC SPACE HAS BEEN ANNOTATED AS THE S1/S2 DISC SPACE. THE DISC SPACE SUPERIOR TO THIS DISC SPACE IS ANNOTATED AS THE L5/S1 LEVEL. IF SURGICAL MANIPULATION IS CONTEMPLATED, WOULD RECOMMEND COMPARISON OF THIS STUDY TO PLAIN FILMS OF THE ENTIRE SPINE FOR COUNTING PURPOSES.    Mild degenerative changes of the lumbar spine.   L5/S1 mild diffuse disc bulge with a small central disc protrusion mildly compressing the left descending S1 nerve root. L5/S1 no evidence of spinal canal stenosis.   L4/L5 mild diffuse disc bulge with a tiny posterior annular tear. L4/L5 no evidence of spinal canal stenosis.   CT LUMBAR SPINE (07/04/22):    The lumbar alignment is maintained without spondylolisthesis. Partial sacralization of the right L5 transverse process is again noted. No acute fracture is identified. The vertebral body heights are  preserved. There is mild degenerative disc disease at L4-5 and L5-S1, increased since 2018. The  paraspinal soft tissues are within normal limits.    There are diffuse disc bulges at L3-4, L4-5, and L5-S1 with a possible superimposed broad-based  central disc herniation at L4-5, mildly increased since previous exam. No significant central canal  stenosis is noted. New moderate left neural foraminal narrowing is seen at L4-5 due to uncovertebral spurring.     IMPRESSION:    No acute osseous abnormality of the lumbar spine. No significant central canal stenosis. Moderate  left neural foraminal narrowing at L4-5.

## 2023-12-13 NOTE — HISTORY OF PRESENT ILLNESS
[FreeTextEntry1] : Verbal consent was given by/on: Fiona Carbajal on 12/13/23  Patient location: Home  Physician location: Office  Reason for Telehealth visit: Back pain  Pt returns for follow-up to discuss her back pain. She recently had a flare-up of back pain the day after Thanksgiving and had relief with a medrol dose pack. She reports 100% pain relief from her JENNIFER previously. She reports now that her pain recently returned. Quality of life is impaired. There has been a severe exacerbation of the patient's chronic pain.  This service took place using a two way audio and visual platform. The patient and Dr. Echavarria were both able to see each other and communicate through video. There were no barriers to communication. Greater than 50% of the time spent in the encounter involved counseling and coordination of care.   Time spent on visit: 30 minutes  HPI: 32 yof presents w/ severe low back and left leg pain. She has had the pain for years but it became severe 48 hours ago. She was seen in the emergency room for this. Quality of life is impaired. There has been a severe exacerbation of the patient's chronic pain.  [Back Pain] : back pain [___ days] : [unfilled] day(s) ago [Constant] : constant [8] : a minimum pain level of 8/10 [10] : a maximum pain level of 10/10 [Sharp] : sharp [Dull] : dull [Throbbing] : throbbing [Shooting] : shooting [Sitting] : sitting [Standing] : standing [Walking] : walking [Bending] : bending [Medications] : medications [FreeTextEntry7] : Lower back pain , radiating down left hip  [5] : 3. What number best describes how, during the past week, pain has interfered with your general activity? 5/10 pain [FreeTextEntry2] : 15

## 2023-12-13 NOTE — PHYSICAL EXAM
[de-identified] : Constitutional: Well-developed, in no acute distress\par  \par  Psychiatric: Appropriate mood and affect, oriented to time, place, person, and situation\par  \par  \par

## 2023-12-13 NOTE — ASSESSMENT
[FreeTextEntry1] : 33 yof w/ severe low back and bilateral leg pain which is returning.  I have personally reviewed the patient's CT in detail and discussed it with them which is significant for moderate left foraminal narrowing at L4-L5.  I have personally reviewed the patient's MRI in detail and discussed it with them which is significant for disc bulge with annular tearing at L4-L5.  She is taking gabapentin at this time, continue this.  Physical therapy prescribed - goal will be to increase ROM, strengthening, postural training, other modalities ad enrique which may include massage and stim. Goals of therapy discussed with the patient in detail and will be discussed with physical therapist. Patient will follow-up following course of physical therapy to monitor progress and adjust therapy as needed.  Acetaminophen 1,000 mg q8h prn for moderate pain. Risks, benefits, and alternatives of acetaminophen discussed with patient.  Ibuprofen 600 mg q8h prn add when pain is not adequately controlled with acetaminophen. Risks, benefits, and alternatives of ibuprofen discussed with patient.  Diet and nutritional strategies discussed which may improve patients pain and will improve overall health.  RTC three months.  I explained to patient benefits and limitation of TeleMedicine visits. Patient understands that limitations include inability to perform comprehensive physical exam, which may lead to potential diagnostic inconsistencies. Patient understands that diagnosis and treatment may be limited by these inconsistencies and patient agrees to proceed with care plan

## 2024-03-07 ENCOUNTER — APPOINTMENT (OUTPATIENT)
Dept: GASTROENTEROLOGY | Facility: HOSPITAL | Age: 34
End: 2024-03-07

## 2024-03-07 ENCOUNTER — RESULT REVIEW (OUTPATIENT)
Age: 34
End: 2024-03-07

## 2024-06-07 ENCOUNTER — RX RENEWAL (OUTPATIENT)
Age: 34
End: 2024-06-07

## 2024-06-07 RX ORDER — GABAPENTIN 300 MG/1
300 CAPSULE ORAL
Qty: 270 | Refills: 0 | Status: ACTIVE | COMMUNITY
Start: 2023-12-13 | End: 1900-01-01

## 2024-06-21 ENCOUNTER — RX RENEWAL (OUTPATIENT)
Age: 34
End: 2024-06-21

## 2024-06-21 RX ORDER — DIPHENOXYLATE HYDROCHLORIDE AND ATROPINE SULFATE 2.5; .025 MG/1; MG/1
2.5-0.025 TABLET ORAL
Qty: 60 | Refills: 0 | Status: ACTIVE | COMMUNITY
Start: 2023-10-18 | End: 1900-01-01

## 2024-08-01 ENCOUNTER — APPOINTMENT (OUTPATIENT)
Dept: PAIN MANAGEMENT | Facility: CLINIC | Age: 34
End: 2024-08-01
Payer: COMMERCIAL

## 2024-08-01 VITALS
HEART RATE: 91 BPM | DIASTOLIC BLOOD PRESSURE: 91 MMHG | HEIGHT: 64 IN | OXYGEN SATURATION: 99 % | SYSTOLIC BLOOD PRESSURE: 127 MMHG

## 2024-08-01 DIAGNOSIS — M79.10 MYALGIA, UNSPECIFIED SITE: ICD-10-CM

## 2024-08-01 DIAGNOSIS — K52.9 NONINFECTIVE GASTROENTERITIS AND COLITIS, UNSPECIFIED: ICD-10-CM

## 2024-08-01 DIAGNOSIS — G89.4 CHRONIC PAIN SYNDROME: ICD-10-CM

## 2024-08-01 DIAGNOSIS — M54.51 VERTEBROGENIC LOW BACK PAIN: ICD-10-CM

## 2024-08-01 DIAGNOSIS — M54.16 RADICULOPATHY, LUMBAR REGION: ICD-10-CM

## 2024-08-01 PROCEDURE — 99214 OFFICE O/P EST MOD 30 MIN: CPT

## 2024-08-01 PROCEDURE — G2211 COMPLEX E/M VISIT ADD ON: CPT | Mod: NC

## 2024-08-02 PROBLEM — M54.51 VERTEBROGENIC LOW BACK PAIN: Status: ACTIVE | Noted: 2024-08-02

## 2024-08-02 NOTE — PHYSICAL EXAM
[de-identified] : Constitutional: Well-developed, in no acute distress\par  \par  Psychiatric: Appropriate mood and affect, oriented to time, place, person, and situation\par  \par  \par  
[de-identified] : Constitutional: Well-developed, in no acute distress\par  \par  Psychiatric: Appropriate mood and affect, oriented to time, place, person, and situation\par  \par  \par  
None

## 2024-08-02 NOTE — HISTORY OF PRESENT ILLNESS
[Back Pain] : back pain [___ days] : [unfilled] day(s) ago [Constant] : constant [8] : a minimum pain level of 8/10 [10] : a maximum pain level of 10/10 [Sharp] : sharp [Dull] : dull [Throbbing] : throbbing [Shooting] : shooting [Sitting] : sitting [Standing] : standing [Walking] : walking [Bending] : bending [Medications] : medications [5] : 3. What number best describes how, during the past week, pain has interfered with your general activity? 5/10 pain [FreeTextEntry1] : Interval History: Pt returns w/ back pain. Has had relief w/ JENNIFER and medrol dose pack in the past. Quality of life is impaired. There has been a severe exacerbation of the patient's chronic pain. Doing well currently.  HPI: 32 yof presents w/ severe low back and left leg pain. She has had the pain for years but it became severe 48 hours ago. She was seen in the emergency room for this. Quality of life is impaired. There has been a severe exacerbation of the patient's chronic pain.   Interventions: JENNIFER  [FreeTextEntry7] : Lower back pain , radiating down left hip  [FreeTextEntry2] : 15

## 2024-08-02 NOTE — ASSESSMENT
[FreeTextEntry1] : 34 yof w/ severe low back pain which is chronic in nature.  I have personally reviewed the patient's CT in detail and discussed it with them which is significant for moderate left foraminal narrowing at L4-L5.  I have personally reviewed the patient's MRI in detail and discussed it with them which is significant for disc bulge with annular tearing at L4-L5. She does have Modic changes.  She is taking gabapentin at this time, continue this.  Pt is a candidate for Intracept if pain persists.   Physical therapy prescribed - goal will be to increase ROM, strengthening, postural training, other modalities ad enrique which may include massage and stim. Goals of therapy discussed with the patient in detail and will be discussed with physical therapist. Patient will follow-up following course of physical therapy to monitor progress and adjust therapy as needed.  Acetaminophen 1,000 mg q8h prn for moderate pain. Risks, benefits, and alternatives of acetaminophen discussed with patient.  Ibuprofen 600 mg q8h prn add when pain is not adequately controlled with acetaminophen. Risks, benefits, and alternatives of ibuprofen discussed with patient.  Diet and nutritional strategies discussed which may improve patients pain and will improve overall health.  RTC three months.  Based on the current evaluation and management, I will provide ongoing, longitudinal care for the complex painful condition described above. Patient is encouraged to reach out with any questions and/or concerns regarding their condition and care.

## 2024-08-12 ENCOUNTER — LABORATORY RESULT (OUTPATIENT)
Age: 34
End: 2024-08-12

## 2024-08-13 ENCOUNTER — NON-APPOINTMENT (OUTPATIENT)
Age: 34
End: 2024-08-13

## 2024-08-14 ENCOUNTER — APPOINTMENT (OUTPATIENT)
Dept: GASTROENTEROLOGY | Facility: CLINIC | Age: 34
End: 2024-08-14
Payer: COMMERCIAL

## 2024-08-14 VITALS
SYSTOLIC BLOOD PRESSURE: 138 MMHG | BODY MASS INDEX: 30.9 KG/M2 | OXYGEN SATURATION: 98 % | HEART RATE: 91 BPM | WEIGHT: 181 LBS | DIASTOLIC BLOOD PRESSURE: 100 MMHG | HEIGHT: 64 IN

## 2024-08-14 DIAGNOSIS — Z80.0 FAMILY HISTORY OF MALIGNANT NEOPLASM OF DIGESTIVE ORGANS: ICD-10-CM

## 2024-08-14 DIAGNOSIS — Z78.9 OTHER SPECIFIED HEALTH STATUS: ICD-10-CM

## 2024-08-14 DIAGNOSIS — K76.0 FATTY (CHANGE OF) LIVER, NOT ELSEWHERE CLASSIFIED: ICD-10-CM

## 2024-08-14 DIAGNOSIS — R19.7 DIARRHEA, UNSPECIFIED: ICD-10-CM

## 2024-08-14 PROBLEM — R79.89 ABNORMAL LIVER FUNCTION TESTS: Status: ACTIVE | Noted: 2024-08-14

## 2024-08-14 PROCEDURE — G2211 COMPLEX E/M VISIT ADD ON: CPT | Mod: NC

## 2024-08-14 PROCEDURE — 99204 OFFICE O/P NEW MOD 45 MIN: CPT

## 2024-08-14 NOTE — ASSESSMENT
[FreeTextEntry1] : Hepatic steatosis, elevated LFTs - Normal CT and US imaging. Normal abdominal exam. Epigastric pain resolved. - Repeat LFTs next week including liver serologies.  - Remain off Zepbound, will hold off alternative weight loss medications at this time. Abstain from alcohol, OTC supplements and medications. - Obtain Fibroscan. Order faxed to [facility] advanced imaging.  - Referral to Dr. Ghosh.  IBS-D, small bowel enteritis - Repeat stool tests - Will follow-up with Dr. Robles in September, MRI will be planned for 2 months after ED visit per Dr. Robles.

## 2024-08-14 NOTE — PHYSICAL EXAM
[Normal] : alert, normal voice/communication, healthy appearing, no acute distress [Sclera] : the sclera and conjunctiva were normal [No Respiratory Distress] : no respiratory distress [Auscultation Breath Sounds / Voice Sounds] : lungs were clear to auscultation bilaterally [Heart Rate And Rhythm] : heart rate was normal and rhythm regular [Bowel Sounds] : normal bowel sounds [Abdomen Tenderness] : non-tender [No Masses] : no abdominal mass palpated [Abdomen Soft] : soft [Normal Color / Pigmentation] : normal skin color and pigmentation [Oriented To Time, Place, And Person] : oriented to person, place, and time

## 2024-08-14 NOTE — HISTORY OF PRESENT ILLNESS
[FreeTextEntry1] : Findings:    Mucosa Normal mucosa was noted in the terminal ileum.Multiple cold forceps  biopsies were performed for histology.    Normal mucosa was noted in the ascending colon, transverse colon and descending  colon.Multiple cold forceps biopsies were performed for histology.    Patchy mild erythema was noted in the rectum and sigmoid colon. 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    Protruding lesions A single flat 10 mm polyp of benign appearance was found in  the transverse colon at 50 cm from the anus.A piece-meal polypectomy was  performed using a cold snare in the transverse colon. The polyp was completely  removed.    Small internal hemorrhoids were noted.    Impressions:    Normal mucosa in the terminal ileum. (Biopsy).    Normal mucosa in the ascending colon, transverse colon and descending colon.  (Biopsy).    Mild erythema in the rectum and sigmoid colon compatible with indeterminate  colitis. (Biopsy).    Polyp (10 mm) in the transverse colon. (Polypectomy).    Internal hemorrhoids.    Plan:    Await pathology results.    Colonoscopy in 2 -3 years based on pathology results.  FINAL DIAGNOSIS   A. TERMINAL ILEUM BIOPSY: - Unremarkable small bowel mucosa  B. ASCENDING COLON BIOPSY: - Colonic mucosa with rare lymphoid aggregate  C. TRANSVERSE COLON BIOPSY: - Unremarkable colonic mucosa  D. TRANSVERSE COLON POLYP AT 50CM BY COLD SNARE: - Hyperplastic polyp  E. DESCENDING COLON BIOPSY: - Unremarkable colonic mucosa  F. SIGMOID COLON BIOPSY: - Unremarkable colonic mucosa  G. RECTAL BIOPSY: - Colonic mucosa with lymphoid aggregate   [de-identified] : Exam Date: 	  08/01/24					 Exam: 	CT ABD/PEL OC IC					 Order#:	[id number]					                CLINICAL INFORMATION: Epigastric pain.   COMPARISON: MRI of the abdomen and pelvis from 8/22/2023. CT the abdomen and pelvis from 7/13/2018.  CONTRAST/COMPLICATIONS: IV Contrast: Omnipaque 350  90 cc administered   10 cc discarded Oral Contrast: Omnipaque 300 Complications: None reported at time of study completion  PROCEDURE:  CT of the Abdomen and Pelvis was performed. Sagittal and coronal reformats were performed.  FINDINGS: LOWER CHEST: Within normal limits.  LIVER: Hepatic steatosis. Right hepatic lobe cyst. BILE DUCTS: Normal caliber. GALLBLADDER: Within normal limits. SPLEEN: Within normal limits. PANCREAS: Within normal limits. ADRENALS: Within normal limits. KIDNEYS/URETERS: Within normal limits.  BLADDER: Within normal limits. REPRODUCTIVE ORGANS: Uterus and adnexa are unremarkable apart from a dominant right ovarian follicle.  BOWEL: No bowel obstruction. Appendix is normal. Thickening of a segment of small bowel in the right hemiabdomen with surrounding mesenteric inflammatory change. PERITONEUM/RETROPERITONEUM: Small volume of bilateral lower quadrant and pelvic free fluid. VESSELS: Within normal limits. LYMPH NODES: No lymphadenopathy.   ABDOMINAL WALL: Tiny fat-containing umbilical hernia. BONES: Degenerative changes of the spine.   IMPRESSION:  Small bowel enteritis involving a segment of small bowel in the right hemiabdomen.  [de-identified] : Exam Date: 	  07/31/24					 Exam: 	US GALLBLADDER					 Order#:	[id number]					                CLINICAL INFORMATION: Epigastric pain  COMPARISON: None available.  TECHNIQUE: Sonography of the right upper quadrant.   FINDINGS:  Liver: Increased echogenicity. Bile ducts: Normal caliber. Common bile duct measures 4 mm.  Gallbladder: Within normal limits. Pancreas: Visualized portions are within normal limits. Right kidney: 10.5 cm. No hydronephrosis. Ascites: None. IVC: Visualized portions are within normal limits.    IMPRESSION:   No acute findings.  Diffuse hepatic steatosis.

## 2024-08-21 ENCOUNTER — LABORATORY RESULT (OUTPATIENT)
Age: 34
End: 2024-08-21

## 2024-08-21 LAB
ALBUMIN SERPL ELPH-MCNC: 5 G/DL
ALP BLD-CCNC: 78 U/L
ALT SERPL-CCNC: 224 U/L
ANION GAP SERPL CALC-SCNC: 15 MMOL/L
AST SERPL-CCNC: 88 U/L
BILIRUB DIRECT SERPL-MCNC: 0.2 MG/DL
BILIRUB INDIRECT SERPL-MCNC: 0.3 MG/DL
BILIRUB SERPL-MCNC: 0.5 MG/DL
BUN SERPL-MCNC: 6 MG/DL
CALCIUM SERPL-MCNC: 9.9 MG/DL
CERULOPLASMIN SERPL-MCNC: 29 MG/DL
CHLORIDE SERPL-SCNC: 99 MMOL/L
CO2 SERPL-SCNC: 25 MMOL/L
CREAT SERPL-MCNC: 0.64 MG/DL
EGFR: 117 ML/MIN/1.73M2
FERRITIN SERPL-MCNC: 538 NG/ML
GLUCOSE SERPL-MCNC: 95 MG/DL
HCT VFR BLD CALC: 47.2 %
HGB BLD-MCNC: 14.6 G/DL
IRON SATN MFR SERPL: 30 %
IRON SERPL-MCNC: 101 UG/DL
MCHC RBC-ENTMCNC: 30.9 GM/DL
MCHC RBC-ENTMCNC: 32.5 PG
MCV RBC AUTO: 105.1 FL
PLATELET # BLD AUTO: 282 K/UL
POTASSIUM SERPL-SCNC: 4.6 MMOL/L
PROT SERPL-MCNC: 7.6 G/DL
RBC # BLD: 4.49 M/UL
RBC # FLD: 13.1 %
SODIUM SERPL-SCNC: 138 MMOL/L
TIBC SERPL-MCNC: 342 UG/DL
UIBC SERPL-MCNC: 241 UG/DL
WBC # FLD AUTO: 6.64 K/UL

## 2024-08-22 LAB
EBV DNA SERPL NAA+PROBE-ACNC: NOT DETECTED IU/ML
EBVPCR LOG: NOT DETECTED LOG10IU/ML
ENDOMYSIUM IGA SER QL: NEGATIVE
ENDOMYSIUM IGA TITR SER: NORMAL
GLIADIN IGA SER QL: 1.2 U/ML
GLIADIN IGG SER QL: 0.9 U/ML
GLIADIN PEPTIDE IGA SER-ACNC: NEGATIVE
GLIADIN PEPTIDE IGG SER-ACNC: NEGATIVE
HBV CORE IGG+IGM SER QL: NONREACTIVE
HBV CORE IGM SER QL: NONREACTIVE
HBV E AG SER QL: NONREACTIVE
HBV SURFACE AB SER QL: REACTIVE
HBV SURFACE AG SER QL: NONREACTIVE
HCV AB SER QL: NONREACTIVE
HCV S/CO RATIO: 0.09 S/CO
HEPATITIS A IGG ANTIBODY: REACTIVE
IGA SER QL IEP: 182 MG/DL
MITOCHONDRIA AB SER IF-ACNC: NORMAL
SMOOTH MUSCLE AB SER QL IF: ABNORMAL
TTG IGA SER IA-ACNC: <0.5 U/ML
TTG IGA SER-ACNC: NEGATIVE
TTG IGG SER IA-ACNC: <0.8 U/ML
TTG IGG SER IA-ACNC: NEGATIVE

## 2024-08-23 ENCOUNTER — NON-APPOINTMENT (OUTPATIENT)
Age: 34
End: 2024-08-23

## 2024-08-23 LAB
ANA SER IF-ACNC: NEGATIVE
MYELOPEROXIDASE AB SER QL IA: <5 UNITS
MYELOPEROXIDASE CELLS FLD QL: NEGATIVE

## 2024-08-28 DIAGNOSIS — R79.89 OTHER SPECIFIED ABNORMAL FINDINGS OF BLOOD CHEMISTRY: ICD-10-CM

## 2024-09-05 LAB
ALBUMIN SERPL ELPH-MCNC: 4.8 G/DL
ALP BLD-CCNC: 79 U/L
ALT SERPL-CCNC: 101 U/L
AST SERPL-CCNC: 53 U/L
BILIRUB DIRECT SERPL-MCNC: 0.1 MG/DL
BILIRUB INDIRECT SERPL-MCNC: 0.3 MG/DL
BILIRUB SERPL-MCNC: 0.4 MG/DL
FERRITIN SERPL-MCNC: 448 NG/ML
PROT SERPL-MCNC: 7.4 G/DL
TM INTERPRETATION: NORMAL

## 2024-09-06 ENCOUNTER — APPOINTMENT (OUTPATIENT)
Dept: GASTROENTEROLOGY | Facility: CLINIC | Age: 34
End: 2024-09-06

## 2024-09-26 ENCOUNTER — APPOINTMENT (OUTPATIENT)
Dept: HEPATOLOGY | Facility: CLINIC | Age: 34
End: 2024-09-26
Payer: COMMERCIAL

## 2024-09-26 VITALS
HEIGHT: 64 IN | SYSTOLIC BLOOD PRESSURE: 128 MMHG | OXYGEN SATURATION: 98 % | HEART RATE: 105 BPM | BODY MASS INDEX: 30.73 KG/M2 | DIASTOLIC BLOOD PRESSURE: 80 MMHG | WEIGHT: 180 LBS

## 2024-09-26 DIAGNOSIS — F41.9 ANXIETY DISORDER, UNSPECIFIED: ICD-10-CM

## 2024-09-26 DIAGNOSIS — R79.89 OTHER SPECIFIED ABNORMAL FINDINGS OF BLOOD CHEMISTRY: ICD-10-CM

## 2024-09-26 DIAGNOSIS — Z13.1 ENCOUNTER FOR SCREENING FOR DIABETES MELLITUS: ICD-10-CM

## 2024-09-26 DIAGNOSIS — F32.A ANXIETY DISORDER, UNSPECIFIED: ICD-10-CM

## 2024-09-26 DIAGNOSIS — Z78.9 OTHER SPECIFIED HEALTH STATUS: ICD-10-CM

## 2024-09-26 DIAGNOSIS — E66.811 OBESITY, CLASS 1: ICD-10-CM

## 2024-09-26 DIAGNOSIS — K76.0 FATTY (CHANGE OF) LIVER, NOT ELSEWHERE CLASSIFIED: ICD-10-CM

## 2024-09-26 DIAGNOSIS — D75.89 OTHER SPECIFIED DISEASES OF BLOOD AND BLOOD-FORMING ORGANS: ICD-10-CM

## 2024-09-26 DIAGNOSIS — Z13.220 ENCOUNTER FOR SCREENING FOR LIPOID DISORDERS: ICD-10-CM

## 2024-09-26 PROCEDURE — G2211 COMPLEX E/M VISIT ADD ON: CPT | Mod: NC

## 2024-09-26 PROCEDURE — 99214 OFFICE O/P EST MOD 30 MIN: CPT

## 2024-09-27 LAB
HAV IGM SER QL: NONREACTIVE
HBV CORE IGM SER QL: NONREACTIVE

## 2024-09-30 LAB
CMV IGM SERPL QL: <8 AU/ML
CMV IGM SERPL QL: NEGATIVE
EBV EA AB SER IA-ACNC: 14.3 U/ML
EBV EA AB TITR SER IF: POSITIVE
EBV EA IGG SER QL IA: 22.2 U/ML
EBV EA IGG SER-ACNC: POSITIVE
EBV EA IGM SER IA-ACNC: NEGATIVE
EBV PATRN SPEC IB-IMP: NORMAL
EBV VCA IGG SER IA-ACNC: 156 U/ML
EBV VCA IGM SER QL IA: <10 U/ML
EPSTEIN-BARR VIRUS CAPSID ANTIGEN IGG: POSITIVE
HEPATITIS E IGM ABY: NEGATIVE

## 2024-10-01 PROBLEM — E66.811 OBESITY (BMI 30.0-34.9): Status: ACTIVE | Noted: 2024-10-01

## 2024-10-01 LAB
PETH 16:0/18:1: 399 NG/ML
PETH 16:0/18:2: 304 NG/ML
PETH COMMENTS: NORMAL

## 2024-10-01 NOTE — HISTORY OF PRESENT ILLNESS
[FreeTextEntry1] :  Ms. ROWELL is a 34-year-old woman with depression, obesity, HTN, IBS-C since age 18, herniated disk, who was referred by her gastroenterologist, Dr. Morales and Esther Andrade, NP for elevated liver enzymes since August 2024.  She went to the ED on 7/30 because 1 day of severe epigastric pain. A CT showed a fatty liver and a dilated loop of small bowel with surrounding inflammatory changes. The pain resolved, but recurred a couple of weeks ago, 7/10 in intensity when she was in [place]. It mostly resolved after a day without intervention. She had more diarrhea than usually for several days and some abdominal discomfort. She has been taking Lomotil infrequently since 2023.  She took Zepbound from 5/15-7/30/24, stopped because of the pain. Did not lose weight. Denies use of NSAID, antibiotics. AST/ALT were normal on 7/17/24, then 155/207 on 8/12/24, and improved gradually to 53/101 on 8/29/24. PLT normal.    Alcohol history: 2 glasses of wine with dinner - shares a bottle with her . 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  Remedies/OTC meds: denies   ROS: Constitutional: no fever, fatigue, weight gain/loss. Eyes: no eye pain or discharge. ENT: no nosebleed, earache, change in hearing. CVS: denies chest pain, palpitations, claudication, irregular heartbeat. Resp: denies SOB, wheezing, cough, SOB on exertion. GI: as above, denies, heartburn, melena. Genitourinary: denies dysuria, incontinence. Musculoskeletal: denies joint pain, joint stiffness. Integumentary: denies pruritus, skin lesions, rash. Neuro: denies confusion, dizziness, fainting. Psych: denies change in personality. Endo: denies muscle weakness. Heme/lymph: denies easy bleeding and bruising.   Test results:  - 8/22/24 MRI abdomen wwo: small R lobe liver cyst. Otherwise normal exam. - 8/22/24 SW elastography: 5.37 kPa - stage F0-1 fibrosis - colonoscopy, EGD, small bowel capsule done by Dr. Morales in the past   Physical exam: Gen: A&Ox3, NAD, obese HEENT: normal outer ears & nose, PERRL, mucus membranes moist, anicteric, no lymphadenopathy CVS: regular rate and rhythm, no murmur Pulm: vesicular breath sounds Abdomen: normal bowel sounds, soft, nontender, no hepatosplenomegaly. Ribs nontender Legs: no edema, no clubbing Musculoskeletal: normal gait Skin: no rash Neuro: no asterixis, EOMI Psych: normal affect

## 2024-10-01 NOTE — ASSESSMENT
[FreeTextEntry1] : Ms. ROWELL is a 34-year-old  with depression, obesity, HTN, IBS-C since age 18, herniated disk, who was referred by her gastroenterologist, Dr. Morales and Esther Andrade, NP for elevated liver enzymes since August 2024. She went to the ED on 7/30/24 because of severe abdominal pain.  - elevated AST//207 on 8/12/24, improved to 53/101 on 8/29 - fatty liver on CT 7/30/24; not described on MRI 8/29/24. Small hepatic cyst R lobe. Gallbladder normal. - liver fibrosis assessments: shear-wave elastography in 8/2024 suggested no fibrosis (stage F-0)    - PLT >250 G/L - obesity, BMI 30.9 in 9/2024. Alcohol use: 2 glasses of wine per day - macrocytosis  fL in 8/2024, with normal Hb 14.6 g/dL  - two episodes of severe abdominal pain, last time a couple of weeks prior  Plan: - bloodwork, 24 hr urine copper - weight loss: reduced caloric intake, vigorous exercise several times per week - reduce alcohol use to max. 1 drink daily - return in 4-6 weeks

## 2024-10-01 NOTE — HISTORY OF PRESENT ILLNESS
[FreeTextEntry1] :  Ms. ROWELL is a 34-year-old woman with depression, obesity, HTN, IBS-C since age 18, herniated disk, who was referred by her gastroenterologist, Dr. Morales and Esther Andrade, NP for elevated liver enzymes since August 2024.  She went to the ED on 7/30 because 1 day of severe epigastric pain. A CT showed a fatty liver and a dilated loop of small bowel with surrounding inflammatory changes. The pain resolved, but recurred a couple of weeks ago, 7/10 in intensity when she was in [place]. It mostly resolved after a day without intervention. She had more diarrhea than usually for several days and some abdominal discomfort. She has been taking Lomotil infrequently since 2023.  She took Zepbound from 5/15-7/30/24, stopped because of the pain. Did not lose weight. Denies use of NSAID, antibiotics. AST/ALT were normal on 7/17/24, then 155/207 on 8/12/24, and improved gradually to 53/101 on 8/29/24. PLT normal.    Alcohol history: 2 glasses of wine with dinner - shares a bottle with her . 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  Remedies/OTC meds: denies   ROS: Constitutional: no fever, fatigue, weight gain/loss. Eyes: no eye pain or discharge. ENT: no nosebleed, earache, change in hearing. CVS: denies chest pain, palpitations, claudication, irregular heartbeat. Resp: denies SOB, wheezing, cough, SOB on exertion. GI: as above, denies, heartburn, melena. Genitourinary: denies dysuria, incontinence. Musculoskeletal: denies joint pain, joint stiffness. Integumentary: denies pruritus, skin lesions, rash. Neuro: denies confusion, dizziness, fainting. Psych: denies change in personality. Endo: denies muscle weakness. Heme/lymph: denies easy bleeding and bruising.   Test results:  - 8/22/24 MRI abdomen wwo: small R lobe liver cyst. Otherwise normal exam. - 8/22/24 SW elastography: 5.37 kPa - stage F0-1 fibrosis - colonoscopy, EGD, small bowel capsule done by Dr. Morales in the past   Physical exam: Gen: A&Ox3, NAD, obese HEENT: normal outer ears & nose, PERRL, mucus membranes moist, anicteric, no lymphadenopathy CVS: regular rate and rhythm, no murmur Pulm: vesicular breath sounds Abdomen: normal bowel sounds, soft, nontender, no hepatosplenomegaly. Ribs nontender Legs: no edema, no clubbing Musculoskeletal: normal gait Skin: no rash Neuro: no asterixis, EOMI Psych: normal affect

## 2024-10-01 NOTE — ASSESSMENT
Father reports Princess was playing basketball last night.  \"he went up for a rebound and jammed her little finger\".  \"Happened around 8:30 pm last nigh\"t.  \"She took ibuprofen and went to bed\". \"This morning she woke up and her finger was as big as her ring finger\".  \"Not much bruising but has redness in the knuckle area\".  Father asking to have her seen.  Informed father Dr Mcguire is out of the office and tomorrows schedule is full.  Discussed with her ortho urgent that is opening from 4 to 8 pm Monday through Friday.  Advised father that she should ice and elevate as well.  Can ivone tape fingers as well.   Father verbalized understanding.   [FreeTextEntry1] : Ms. ROWELL is a 34-year-old  with depression, obesity, HTN, IBS-C since age 18, herniated disk, who was referred by her gastroenterologist, Dr. Morales and Esther Andrade, NP for elevated liver enzymes since August 2024. She went to the ED on 7/30/24 because of severe abdominal pain.  - elevated AST//207 on 8/12/24, improved to 53/101 on 8/29 - fatty liver on CT 7/30/24; not described on MRI 8/29/24. Small hepatic cyst R lobe. Gallbladder normal. - liver fibrosis assessments: shear-wave elastography in 8/2024 suggested no fibrosis (stage F-0)    - PLT >250 G/L - obesity, BMI 30.9 in 9/2024. Alcohol use: 2 glasses of wine per day - macrocytosis  fL in 8/2024, with normal Hb 14.6 g/dL  - two episodes of severe abdominal pain, last time a couple of weeks prior  Plan: - bloodwork, 24 hr urine copper - weight loss: reduced caloric intake, vigorous exercise several times per week - reduce alcohol use to max. 1 drink daily - return in 4-6 weeks

## 2024-10-01 NOTE — HISTORY OF PRESENT ILLNESS
[FreeTextEntry1] :  Ms. ROWELL is a 34-year-old woman with depression, obesity, HTN, IBS-C since age 18, herniated disk, who was referred by her gastroenterologist, Dr. Morales and Esther Andrade, NP for elevated liver enzymes since August 2024.  She went to the ED on 7/30 because 1 day of severe epigastric pain. A CT showed a fatty liver and a dilated loop of small bowel with surrounding inflammatory changes. The pain resolved, but recurred a couple of weeks ago, 7/10 in intensity when she was in [place]. It mostly resolved after a day without intervention. She had more diarrhea than usually for several days and some abdominal discomfort. She has been taking Lomotil infrequently since 2023.  She took Zepbound from 5/15-7/30/24, stopped because of the pain. Did not lose weight. Denies use of NSAID, antibiotics. AST/ALT were normal on 7/17/24, then 155/207 on 8/12/24, and improved gradually to 53/101 on 8/29/24. PLT normal.    Alcohol history: 2 glasses of wine with dinner - shares a bottle with her . 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  Remedies/OTC meds: denies   ROS: Constitutional: no fever, fatigue, weight gain/loss. Eyes: no eye pain or discharge. ENT: no nosebleed, earache, change in hearing. CVS: denies chest pain, palpitations, claudication, irregular heartbeat. Resp: denies SOB, wheezing, cough, SOB on exertion. GI: as above, denies, heartburn, melena. Genitourinary: denies dysuria, incontinence. Musculoskeletal: denies joint pain, joint stiffness. Integumentary: denies pruritus, skin lesions, rash. Neuro: denies confusion, dizziness, fainting. Psych: denies change in personality. Endo: denies muscle weakness. Heme/lymph: denies easy bleeding and bruising.   Test results:  - 8/22/24 MRI abdomen wwo: small R lobe liver cyst. Otherwise normal exam. - 8/22/24 SW elastography: 5.37 kPa - stage F0-1 fibrosis - colonoscopy, EGD, small bowel capsule done by Dr. Morales in the past   Physical exam: Gen: A&Ox3, NAD, obese HEENT: normal outer ears & nose, PERRL, mucus membranes moist, anicteric, no lymphadenopathy CVS: regular rate and rhythm, no murmur Pulm: vesicular breath sounds Abdomen: normal bowel sounds, soft, nontender, no hepatosplenomegaly. Ribs nontender Legs: no edema, no clubbing Musculoskeletal: normal gait Skin: no rash Neuro: no asterixis, EOMI Psych: normal affect

## 2024-10-09 ENCOUNTER — APPOINTMENT (OUTPATIENT)
Dept: GASTROENTEROLOGY | Facility: CLINIC | Age: 34
End: 2024-10-09
Payer: COMMERCIAL

## 2024-10-09 VITALS
BODY MASS INDEX: 31.58 KG/M2 | OXYGEN SATURATION: 98 % | HEIGHT: 64 IN | WEIGHT: 185 LBS | SYSTOLIC BLOOD PRESSURE: 116 MMHG | DIASTOLIC BLOOD PRESSURE: 68 MMHG | HEART RATE: 106 BPM

## 2024-10-09 DIAGNOSIS — K60.2 ANAL FISSURE, UNSPECIFIED: ICD-10-CM

## 2024-10-09 DIAGNOSIS — R19.7 DIARRHEA, UNSPECIFIED: ICD-10-CM

## 2024-10-09 DIAGNOSIS — K52.9 NONINFECTIVE GASTROENTERITIS AND COLITIS, UNSPECIFIED: ICD-10-CM

## 2024-10-09 PROCEDURE — G2211 COMPLEX E/M VISIT ADD ON: CPT | Mod: NC

## 2024-10-09 PROCEDURE — 99214 OFFICE O/P EST MOD 30 MIN: CPT

## 2024-10-09 RX ORDER — NITROGLYCERIN 4 MG/G
0.4 OINTMENT RECTAL
Qty: 1 | Refills: 0 | Status: ACTIVE | COMMUNITY
Start: 2024-10-09 | End: 1900-01-01

## 2024-10-10 LAB
BASOPHILS # BLD AUTO: 0.06 K/UL
BASOPHILS NFR BLD AUTO: 0.6 %
EOSINOPHIL # BLD AUTO: 0.15 K/UL
EOSINOPHIL NFR BLD AUTO: 1.5 %
HCT VFR BLD CALC: 46.4 %
HGB BLD-MCNC: 15 G/DL
IMM GRANULOCYTES NFR BLD AUTO: 0.5 %
LYMPHOCYTES # BLD AUTO: 1.63 K/UL
LYMPHOCYTES NFR BLD AUTO: 16.5 %
MAN DIFF?: NORMAL
MCHC RBC-ENTMCNC: 32.3 GM/DL
MCHC RBC-ENTMCNC: 32.4 PG
MCV RBC AUTO: 100.2 FL
MONOCYTES # BLD AUTO: 0.74 K/UL
MONOCYTES NFR BLD AUTO: 7.5 %
NEUTROPHILS # BLD AUTO: 7.23 K/UL
NEUTROPHILS NFR BLD AUTO: 73.4 %
PLATELET # BLD AUTO: 278 K/UL
RBC # BLD: 4.63 M/UL
RBC # FLD: 13 %
WBC # FLD AUTO: 9.86 K/UL

## 2024-10-11 LAB
ALBUMIN SERPL ELPH-MCNC: 4.7 G/DL
ALP BLD-CCNC: 84 U/L
ALT SERPL-CCNC: 40 U/L
ANION GAP SERPL CALC-SCNC: 17 MMOL/L
AST SERPL-CCNC: 27 U/L
BILIRUB SERPL-MCNC: 0.4 MG/DL
BUN SERPL-MCNC: 9 MG/DL
CALCIUM SERPL-MCNC: 9.7 MG/DL
CHLORIDE SERPL-SCNC: 97 MMOL/L
CO2 SERPL-SCNC: 20 MMOL/L
CREAT SERPL-MCNC: 0.57 MG/DL
CRP SERPL-MCNC: 7 MG/L
EGFR: 121 ML/MIN/1.73M2
ERYTHROCYTE [SEDIMENTATION RATE] IN BLOOD BY WESTERGREN METHOD: 34 MM/HR
FERRITIN SERPL-MCNC: 349 NG/ML
FOLATE SERPL-MCNC: 5.3 NG/ML
GLUCOSE SERPL-MCNC: 89 MG/DL
IRON SATN MFR SERPL: 26 %
IRON SERPL-MCNC: 98 UG/DL
POTASSIUM SERPL-SCNC: 4.6 MMOL/L
PROT SERPL-MCNC: 7.7 G/DL
SODIUM SERPL-SCNC: 134 MMOL/L
TIBC SERPL-MCNC: 372 UG/DL
UIBC SERPL-MCNC: 274 UG/DL
VIT B12 SERPL-MCNC: 404 PG/ML

## 2024-10-14 LAB — E HISTOLYT AB SER-ACNC: NEGATIVE

## 2024-11-13 ENCOUNTER — RESULT REVIEW (OUTPATIENT)
Age: 34
End: 2024-11-13

## 2024-11-27 DIAGNOSIS — R19.7 DIARRHEA, UNSPECIFIED: ICD-10-CM

## 2024-11-27 RX ORDER — CHOLESTYRAMINE 4 G/9G
4 POWDER, FOR SUSPENSION ORAL DAILY
Qty: 1 | Refills: 3 | Status: ACTIVE | COMMUNITY
Start: 2024-11-27 | End: 1900-01-01

## 2024-12-25 PROBLEM — F10.90 ALCOHOL USE: Status: ACTIVE | Noted: 2024-09-26

## 2025-03-06 ENCOUNTER — APPOINTMENT (OUTPATIENT)
Dept: HEPATOLOGY | Facility: CLINIC | Age: 35
End: 2025-03-06